# Patient Record
Sex: FEMALE | Race: WHITE | NOT HISPANIC OR LATINO | Employment: FULL TIME | URBAN - NONMETROPOLITAN AREA
[De-identification: names, ages, dates, MRNs, and addresses within clinical notes are randomized per-mention and may not be internally consistent; named-entity substitution may affect disease eponyms.]

---

## 2020-12-14 ENCOUNTER — OFFICE VISIT (OUTPATIENT)
Dept: UROLOGY | Facility: CLINIC | Age: 60
End: 2020-12-14

## 2020-12-14 VITALS — BODY MASS INDEX: 31.68 KG/M2 | HEIGHT: 61 IN | WEIGHT: 167.8 LBS | TEMPERATURE: 98.1 F

## 2020-12-14 DIAGNOSIS — N95.2 ATROPHIC VAGINITIS: ICD-10-CM

## 2020-12-14 DIAGNOSIS — R35.0 FREQUENCY OF MICTURITION: Primary | ICD-10-CM

## 2020-12-14 DIAGNOSIS — N39.0 RECURRENT UTI: ICD-10-CM

## 2020-12-14 DIAGNOSIS — R31.29 OTHER MICROSCOPIC HEMATURIA: ICD-10-CM

## 2020-12-14 DIAGNOSIS — R33.9 INCOMPLETE EMPTYING OF BLADDER: ICD-10-CM

## 2020-12-14 LAB
BILIRUB BLD-MCNC: NEGATIVE MG/DL
CLARITY, POC: CLEAR
COLOR UR: YELLOW
GLUCOSE UR STRIP-MCNC: NEGATIVE MG/DL
KETONES UR QL: NEGATIVE
LEUKOCYTE EST, POC: NEGATIVE
NITRITE UR-MCNC: NEGATIVE MG/ML
PH UR: 7 [PH] (ref 5–8)
PROT UR STRIP-MCNC: NEGATIVE MG/DL
RBC # UR STRIP: ABNORMAL /UL
SP GR UR: 1.02 (ref 1–1.03)
UROBILINOGEN UR QL: NORMAL

## 2020-12-14 PROCEDURE — 81003 URINALYSIS AUTO W/O SCOPE: CPT | Performed by: NURSE PRACTITIONER

## 2020-12-14 PROCEDURE — 99204 OFFICE O/P NEW MOD 45 MIN: CPT | Performed by: NURSE PRACTITIONER

## 2020-12-14 PROCEDURE — 87086 URINE CULTURE/COLONY COUNT: CPT | Performed by: NURSE PRACTITIONER

## 2020-12-14 PROCEDURE — 51798 US URINE CAPACITY MEASURE: CPT | Performed by: NURSE PRACTITIONER

## 2020-12-14 RX ORDER — NITROFURANTOIN 25; 75 MG/1; MG/1
100 CAPSULE ORAL
COMMUNITY
Start: 2020-12-09 | End: 2020-12-17

## 2020-12-14 RX ORDER — ZOLPIDEM TARTRATE 10 MG/1
10 TABLET ORAL
COMMUNITY
Start: 2020-12-08

## 2020-12-14 NOTE — PROGRESS NOTES
Chief Complaint:          Chief Complaint   Patient presents with   • Urinary Frequency     New Patient    • Urinary Tract Infection       HPI:   60 y.o. female.very pleasant patient Presents to clinic today.    She has been referred by her primary care Dr. Blaine Romero DO,  with concerns about recurrent UTI'S. She reports the numerous use of antibiotics in the last few months for UTI. However she is unsure about any documented positive urine culture.     She reports urine frequency, urgency, and dysuria.  She has pelvic pain, pressure and bladder pain/discomfort. She reports abdominal pain, back pain and left CVA tenderness. She denies  gross hematuria. No  Fevers/chills, no N/V/D. She denies any issues with severe constipation, denies stress urinary incontinence.    Her Urine dipstick today is completely negative for any infection.  She has trace microscopic hematuria.  Her PVR is 10 cc.  She reports seeing OBGYN earlier this year in January for her pelvic/bladder discomfort and had a negative Pap smear.  She has been  placed on Premarin cream for Atrophic vaginitis.    She is a G2, P2 has had a partial hysterectomy, breast reduction.  She does not smoke, denies any family history of breast/uterine/bladder cancer.      She is a , relatively healthy with no significant medical problems besides insomnia, anxiety, osteoarthritis.    Past Medical History:      History reviewed. No pertinent past medical history.      The following portions of the patient's history were reviewed and updated as appropriate: allergies, current medications, past family history, past medical history, past social history, past surgical history and problem list.    Current Meds:     Current Outpatient Medications   Medication Sig Dispense Refill   • nitrofurantoin, macrocrystal-monohydrate, (MACROBID) 100 MG capsule 100 mg.     • zolpidem (AMBIEN) 10 MG tablet Take 10 mg by mouth every night at bedtime.       No  current facility-administered medications for this visit.         Allergies:      Allergies   Allergen Reactions   • Bactrim [Sulfamethoxazole-Trimethoprim] Swelling     Itching,throat felt tight         Past Surgical History:     History reviewed. No pertinent surgical history.      Social History:     Social History     Socioeconomic History   • Marital status: Unknown     Spouse name: Not on file   • Number of children: Not on file   • Years of education: Not on file   • Highest education level: Not on file       Family History:     History reviewed. No pertinent family history.    Review of Systems:     Review of Systems   Constitutional: Positive for fatigue. Negative for activity change, chills and fever.   HENT: Positive for sinus pressure. Negative for congestion.    Eyes: Negative for blurred vision, pain and redness.   Respiratory: Negative for chest tightness and shortness of breath.    Cardiovascular: Negative for chest pain.   Gastrointestinal: Positive for abdominal pain. Negative for constipation, diarrhea, nausea and vomiting.   Endocrine: Negative for heat intolerance.   Genitourinary: Positive for dyspareunia, dysuria, flank pain, frequency, hematuria, pelvic pain, pelvic pressure and urgency. Negative for difficulty urinating, genital sores, urinary incontinence and vaginal discharge.   Musculoskeletal: Positive for back pain.   Skin: Negative for rash.   Neurological: Negative for dizziness, headache and confusion.   Hematological: Does not bruise/bleed easily.   Psychiatric/Behavioral: Positive for sleep disturbance and stress. Negative for behavioral problems and decreased concentration. The patient is nervous/anxious.         Physical Exam:     Physical Exam  Constitutional:       General: She is not in acute distress.     Appearance: She is well-developed.   HENT:      Head: Normocephalic and atraumatic.   Eyes:      Pupils: Pupils are equal, round, and reactive to light.   Neck:       Musculoskeletal: Normal range of motion.      Thyroid: No thyromegaly.      Trachea: No tracheal deviation.   Cardiovascular:      Rate and Rhythm: Normal rate and regular rhythm.      Heart sounds: No murmur.   Pulmonary:      Effort: Pulmonary effort is normal. No respiratory distress.      Breath sounds: Normal breath sounds. No stridor. No wheezing.   Abdominal:      General: Bowel sounds are normal.      Palpations: Abdomen is soft.      Tenderness: There is abdominal tenderness.   Genitourinary:     Labia:         Right: No tenderness.         Left: No tenderness.       Vagina: Normal. No vaginal discharge.      Comments: Soft nontender abdomen with no organomegaly, rigidity, guarding or tenderness.  Normal vaginal orifice.  She has  no leakage with Valsalva.  No significant perineal body abnormalities and a normal external anus.     Musculoskeletal: Normal range of motion.         General: Tenderness present. No deformity.   Skin:     General: Skin is warm and dry.      Capillary Refill: Capillary refill takes less than 2 seconds.      Coloration: Skin is not pale.      Findings: No erythema or rash.   Neurological:      Mental Status: She is alert and oriented to person, place, and time.      Cranial Nerves: No cranial nerve deficit.      Sensory: No sensory deficit.      Coordination: Coordination normal.   Psychiatric:         Behavior: Behavior normal.         Thought Content: Thought content normal.         Judgment: Judgment normal.         Procedure:       Assessment/Plan:        ASSESSMENT  Recurrent urinary tract infections/Atrophic Vaginitis : Very pleasant and energetic 60 year old female evaluated in clinic today for  recurrent UTI's, abdominal/pelvic pain, vaginal dryness.  Upon evaluation today, she reports episodes of frequency and urgency,  back/abdominal pain that have remained very bothersome to her. She has urinary symptoms of dysuria,  burning on urination. She has not had any gross  hematuria. She denies N/V/D.  Currently completed antibiotics for UTI, urine dipstick today is negative for any infection.  PVR is 10 cc.     We discussed the types of organisms that are found in the urinary tract indicating that the vast majority are results of the patient's own gastrointestinal familia.  We discussed the risk factors for recurrent infections being intercourse in younger patients and atrophic changes in older patients.      We discussed the symptoms that are found including pain, pressure, burning, frequency, urgency suprapubic pain. We discussed how many of the antibiotics that are utilized can actually exacerbate these infections by creating resistant organisms and there is only a very few antibiotics(Macrobid) that are concentrated in the urine and do not affect the rectal reservoir nor cause recurrent yeast vaginitis.       We discussed the risk factors for recurrent infections being intercourse in younger patients and atrophic changes in older patients.  We discussed the symptoms that are found including pain, pressure, burning, frequency, urgency suprapubic pain and painful intercourse. I discussed upper tract symptoms including fevers, chills, and indicated the workup would be much more aggressive if the patient were to present with recurrent infections in the face of upper tract symptomatology such as fever.        PLAN  We will send her urine for culture today, call her with results and definitive plan of care.    We discussed starting antibiotic prophylaxis with Macrobid 100 mg daily.  She should increase her p.o. fluid intake to at least 1 to 2 L daily and avoid bladder irritants such as caffeine products, spicy foods, and citrusy foods.     I recommend concomitant probiotics with treatment with antibiotics to protect the rectal reservoir including over-the-counter yogurt preparations to rachel oral pills containing the appropriate probiotics. Patient reports the diligent use of  Probiotics.    She is to also continue her Premarin cream for atrophic vaginitis as directed.    Discuss getting CT Abd/Pelvis to rule out any Parenchymal disorder -upper tract investigation.    We discussed lower tract investigation, patient has been scheduled for cystoscopy on 01/26/2021    Will See patient in TWO weeks  and review her Labs/CT scan results    Patient Agreeable with plan of care.    Patient reports that she is not currently experiencing any symptoms of urinary incontinence.    Patient's Body mass index is 31.71 kg/m². BMI is above normal parameters. Recommendations include: educational material, exercise counseling and nutrition counseling.    Smoking Cessation Counseling:  Never a smoker.  Patient does not currently use any tobacco products.     Counseling was given to patient for the following topics diagnostic results including: Acute cystitis with hematuria, atrophic vaginitis., instructions for management as follows: Increase p.o. fluid intake, antibiotic suppressive therapy with Macrobid, probiotics, Pyridium as needed, Phenergan for nausea, perineal hygiene., risk factor reductions including: Bladder irritants such as caffeine products, coffee, tea, soda drinks, citrusy/spicy foods. and patient and family education including: Acute cystitis, recurrent UTIs, cystoscopy, CT scan abdomen/pelvis.. The interim medical history and current results were reviewed.  A treatment plan with follow-up was made for Frequency of micturition [R35.0].           This document has been electronically signed by Griselda Cheng-Akwa, APRN December 14, 2020 13:51 EST

## 2020-12-15 LAB — BACTERIA SPEC AEROBE CULT: NO GROWTH

## 2020-12-17 ENCOUNTER — TELEPHONE (OUTPATIENT)
Dept: UROLOGY | Facility: CLINIC | Age: 60
End: 2020-12-17

## 2020-12-17 RX ORDER — NITROFURANTOIN 25; 75 MG/1; MG/1
100 CAPSULE ORAL DAILY
Qty: 56 CAPSULE | Refills: 3 | Status: SHIPPED | OUTPATIENT
Start: 2020-12-17 | End: 2021-04-06 | Stop reason: SDUPTHER

## 2020-12-17 RX ORDER — PHENAZOPYRIDINE HYDROCHLORIDE 200 MG/1
200 TABLET, FILM COATED ORAL 3 TIMES DAILY PRN
Qty: 20 TABLET | Refills: 0 | Status: SHIPPED | OUTPATIENT
Start: 2020-12-17 | End: 2021-04-06 | Stop reason: SDUPTHER

## 2020-12-17 RX ORDER — PROMETHAZINE HYDROCHLORIDE 25 MG/1
25 TABLET ORAL EVERY 8 HOURS PRN
Qty: 20 TABLET | Refills: 0 | Status: SHIPPED | OUTPATIENT
Start: 2020-12-17 | End: 2021-01-26 | Stop reason: SDUPTHER

## 2020-12-17 NOTE — TELEPHONE ENCOUNTER
Called patient to  Check on how she was doing post clinic visit, and also to discuss urine culture results came back negative for any growth. Spoke with her .    Urine Culture No growth        Continue abx suppressive therapy as discussed, probiotics and get CT. May call with questions.

## 2020-12-18 ENCOUNTER — TELEPHONE (OUTPATIENT)
Dept: UROLOGY | Facility: CLINIC | Age: 60
End: 2020-12-18

## 2020-12-18 NOTE — PATIENT INSTRUCTIONS
Atrophic Vaginitis  Atrophic vaginitis is a condition in which the tissues that line the vagina become dry and thin. This condition occurs in women who have stopped having their period. It is caused by a drop in a female hormone (estrogen). This hormone helps:  · To keep the vagina moist.  · To make a clear fluid. This clear fluid helps:  ? To make the vagina ready for sex.  ? To protect the vagina from infection.  If the lining of the vagina is dry and thin, it may cause irritation, burning, or itchiness. It may also:  · Make sex painful.  · Make an exam of your vagina painful.  · Cause bleeding.  · Make you lose interest in sex.  · Cause a burning feeling when you pee (urinate).  · Cause a brown or yellow fluid to come from your vagina.  Some women do not have symptoms.  Follow these instructions at home:  Medicines  · Take over-the-counter and prescription medicines only as told by your doctor.  · Do not use herbs or other medicines unless your doctor says it is okay.  · Use medicines for for dryness. These include:  ? Oils to make the vagina soft.  ? Creams.  ? Moisturizers.  General instructions  · Do not douche.  · Do not use products that can make your vagina dry. These include:  ? Scented sprays.  ? Scented tampons.  ? Scented soaps.  · Sex can help increase blood flow and soften the tissue in the vagina. If it hurts to have sex:  ? Tell your partner.  ? Use products to make sex more comfortable. Use these only as told by your doctor.  Contact a doctor if you:  · Have discharge from the vagina that is different than usual.  · Have a bad smell coming from your vagina.  · Have new symptoms.  · Do not get better.  · Get worse.  Summary  · Atrophic vaginitis is a condition in which the lining of the vagina becomes dry and thin.  · This condition affects women who have stopped having their periods.  · Treatment may include using products that help make the vagina soft.  · Call a doctor if do not get better with  treatment.  This information is not intended to replace advice given to you by your health care provider. Make sure you discuss any questions you have with your health care provider.  Document Revised: 12/31/2018 Document Reviewed: 12/31/2018  [employer] Patient Education © 2020 [email or web address]  Dysuria  Dysuria is pain or discomfort while urinating. The pain or discomfort may be felt in the part of your body that drains urine from the bladder (urethra) or in the surrounding tissue of the genitals. The pain may also be felt in the groin area, lower abdomen, or lower back. You may have to urinate frequently or have the sudden feeling that you have to urinate (urgency). Dysuria can affect both men and women, but it is more common in women.  Dysuria can be caused by many different things, including:  · Urinary tract infection.  · Kidney stones or bladder stones.  · Certain sexually transmitted infections (STIs), such as chlamydia.  · Dehydration.  · Inflammation of the tissues of the vagina.  · Use of certain medicines.  · Use of certain soaps or scented products that cause irritation.  Follow these instructions at home:  General instructions  · Watch your condition for any changes.  · Urinate often. Avoid holding urine for long periods of time.  · After a bowel movement or urination, women should cleanse from front to back, using each tissue only once.  · Urinate after sexual intercourse.  · Keep all follow-up visits as told by your health care provider. This is important.  · If you had any tests done to find the cause of dysuria, it is up to you to get your test results. Ask your health care provider, or the department that is doing the test, when your results will be ready.  Eating and drinking    · Drink enough fluid to keep your urine pale yellow.  · Avoid caffeine, tea, and alcohol. They can irritate the bladder and make dysuria worse. In men, alcohol may irritate the prostate.  Medicines  · Take over-the-counter and  prescription medicines only as told by your health care provider.  · If you were prescribed an antibiotic medicine, take it as told by your health care provider. Do not stop taking the antibiotic even if you start to feel better.  Contact a health care provider if:  · You have a fever.  · You develop pain in your back or sides.  · You have nausea or vomiting.  · You have blood in your urine.  · You are not urinating as often as you usually do.  Get help right away if:  · Your pain is severe and not relieved with medicines.  · You cannot eat or drink without vomiting.  · You are confused.  · You have a rapid heartbeat while at rest.  · You have shaking or chills.  · You feel extremely weak.  Summary  · Dysuria is pain or discomfort while urinating. Many different conditions can lead to dysuria.  · If you have dysuria, you may have to urinate frequently or have the sudden feeling that you have to urinate (urgency).  · Watch your condition for any changes. Keep all follow-up visits as told by your health care provider.  · Make sure that you urinate often and drink enough fluid to keep your urine pale yellow.  This information is not intended to replace advice given to you by your health care provider. Make sure you discuss any questions you have with your health care provider.  Document Revised: 11/30/2018 Document Reviewed: 10/04/2018  [employer] Patient Education © 2020 [employer]  Urinary Tract Infection, Adult  A urinary tract infection (UTI) is an infection of any part of the urinary tract. The urinary tract includes:  · The kidneys.  · The ureters.  · The bladder.  · The urethra.  These organs make, store, and get rid of pee (urine) in the body.  What are the causes?  This is caused by germs (bacteria) in your genital area. These germs grow and cause swelling (inflammation) of your urinary tract.  What increases the risk?  You are more likely to develop this condition if:  · You have a small, thin tube  (catheter) to drain pee.  · You cannot control when you pee or poop (incontinence).  · You are female, and:  ? You use these methods to prevent pregnancy:  § A medicine that kills sperm (spermicide).  § A device that blocks sperm (diaphragm).  ? You have low levels of a female hormone (estrogen).  ? You are pregnant.  · You have genes that add to your risk.  · You are sexually active.  · You take antibiotic medicines.  · You have trouble peeing because of:  ? A prostate that is bigger than normal, if you are male.  ? A blockage in the part of your body that drains pee from the bladder (urethra).  ? A kidney stone.  ? A nerve condition that affects your bladder (neurogenic bladder).  ? Not getting enough to drink.  ? Not peeing often enough.  · You have other conditions, such as:  ? Diabetes.  ? A weak disease-fighting system (immune system).  ? Sickle cell disease.  ? Gout.  ? Injury of the spine.  What are the signs or symptoms?  Symptoms of this condition include:  · Needing to pee right away (urgently).  · Peeing often.  · Peeing small amounts often.  · Pain or burning when peeing.  · Blood in the pee.  · Pee that smells bad or not like normal.  · Trouble peeing.  · Pee that is cloudy.  · Fluid coming from the vagina, if you are female.  · Pain in the belly or lower back.  Other symptoms include:  · Throwing up (vomiting).  · No urge to eat.  · Feeling mixed up (confused).  · Being tired and grouchy (irritable).  · A fever.  · Watery poop (diarrhea).  How is this treated?  This condition may be treated with:  · Antibiotic medicine.  · Other medicines.  · Drinking enough water.  Follow these instructions at home:    Medicines  · Take over-the-counter and prescription medicines only as told by your doctor.  · If you were prescribed an antibiotic medicine, take it as told by your doctor. Do not stop taking it even if you start to feel better.  General instructions  · Make sure you:  ? Pee until your bladder is  empty.  ? Do not hold pee for a long time.  ? Empty your bladder after sex.  ? Wipe from front to back after pooping if you are a female. Use each tissue one time when you wipe.  · Drink enough fluid to keep your pee pale yellow.  · Keep all follow-up visits as told by your doctor. This is important.  Contact a doctor if:  · You do not get better after 1-2 days.  · Your symptoms go away and then come back.  Get help right away if:  · You have very bad back pain.  · You have very bad pain in your lower belly.  · You have a fever.  · You are sick to your stomach (nauseous).  · You are throwing up.  Summary  · A urinary tract infection (UTI) is an infection of any part of the urinary tract.  · This condition is caused by germs in your genital area.  · There are many risk factors for a UTI. These include having a small, thin tube to drain pee and not being able to control when you pee or poop.  · Treatment includes antibiotic medicines for germs.  · Drink enough fluid to keep your pee pale yellow.  This information is not intended to replace advice given to you by your health care provider. Make sure you discuss any questions you have with your health care provider.  Document Revised: 12/05/2019 Document Reviewed: 06/27/2019  [employer] Patient Education © 2020 [employer]

## 2020-12-30 ENCOUNTER — OFFICE VISIT (OUTPATIENT)
Dept: UROLOGY | Facility: CLINIC | Age: 60
End: 2020-12-30

## 2020-12-30 VITALS — BODY MASS INDEX: 31.53 KG/M2 | TEMPERATURE: 96.8 F | WEIGHT: 167 LBS | HEIGHT: 61 IN

## 2020-12-30 DIAGNOSIS — R35.0 FREQUENCY OF URINATION: Primary | ICD-10-CM

## 2020-12-30 LAB
BILIRUB BLD-MCNC: NEGATIVE MG/DL
CLARITY, POC: CLEAR
COLOR UR: YELLOW
GLUCOSE UR STRIP-MCNC: NEGATIVE MG/DL
KETONES UR QL: NEGATIVE
LEUKOCYTE EST, POC: NEGATIVE
NITRITE UR-MCNC: NEGATIVE MG/ML
PH UR: 5.5 [PH] (ref 5–8)
PROT UR STRIP-MCNC: NEGATIVE MG/DL
RBC # UR STRIP: ABNORMAL /UL
SP GR UR: 1.01 (ref 1–1.03)
UROBILINOGEN UR QL: NORMAL

## 2020-12-30 PROCEDURE — 99214 OFFICE O/P EST MOD 30 MIN: CPT | Performed by: NURSE PRACTITIONER

## 2020-12-30 PROCEDURE — 81003 URINALYSIS AUTO W/O SCOPE: CPT | Performed by: NURSE PRACTITIONER

## 2020-12-30 NOTE — PROGRESS NOTES
Chief Complaint:          Chief Complaint   Patient presents with   • Urinary Frequency     follow up Recurrent UTIs/Atrophic Vaginitis       HPI:   60 y.o. female.  Evaluated in clinic 2 weeks ago for recurrent UTIs and bothersome urinary symptoms returns for follow-up today.  The plan was to review her CT abdomen and pelvis today, pending cystoscopy,  nevertheless patient reports she has been unable to get her CT done prior to this appointment.  Patient has CT scheduled for January 13, 2021.    She however reports some improvement in her urinary symptoms since starting antibiotic prophylaxis with Macrobid daily, Pyridium, and Myrbetriq as ordered.  Significant is her Premarin cream for atrophic vaginitis.  She is pleased with her slow but remarkable outcome so far.    Urine dipstick today is completely negative for any infection, she has 1+ microscopic hematuria.  Her last urine culture was negative for any growth. She reports seeing OBGYN earlier this year in January for her pelvic/bladder discomfort and had a negative Pap smear.  .     She is a G2, P2 has had a partial hysterectomy, breast reduction.  She does not smoke, denies any family history of breast/uterine/bladder cancer.       She is a , relatively healthy with no significant medical problems besides insomnia, anxiety, osteoarthritis.      Past Medical History:        Past Medical History:   Diagnosis Date   • Arthritis    • Urinary tract infection      The following portions of the patient's history were reviewed and updated as appropriate: allergies, current medications, past family history, past medical history, past social history, past surgical history and problem list.    Current Meds:     Current Outpatient Medications   Medication Sig Dispense Refill   • conjugated estrogens (PREMARIN) 0.625 MG/GM vaginal cream DO NOT USE SYRINGE. APPLY PEA SIZE TO INDEX FINGER, APPLY TO VAGINA TWICE WEEKLY. 30 g 2   • nitrofurantoin,  macrocrystal-monohydrate, (Macrobid) 100 MG capsule Take 1 capsule by mouth Daily. 56 capsule 3   • phenazopyridine (Pyridium) 200 MG tablet Take 1 tablet by mouth 3 (Three) Times a Day As Needed for Bladder Spasms. 20 tablet 0   • promethazine (PHENERGAN) 25 MG tablet Take 1 tablet by mouth Every 8 (Eight) Hours As Needed for Nausea or Vomiting. 20 tablet 0   • zolpidem (AMBIEN) 10 MG tablet Take 10 mg by mouth every night at bedtime.       No current facility-administered medications for this visit.         Allergies:      Allergies   Allergen Reactions   • Bactrim [Sulfamethoxazole-Trimethoprim] Swelling     Itching,throat felt tight         Past Surgical History:     Past Surgical History:   Procedure Laterality Date   • HYSTERECTOMY     • LAPAROSCOPIC CHOLECYSTECTOMY     • NOSE SURGERY     • REDUCTION MAMMAPLASTY           Social History:     Social History     Socioeconomic History   • Marital status: Unknown     Spouse name: Not on file   • Number of children: Not on file   • Years of education: Not on file   • Highest education level: Not on file   Tobacco Use   • Smoking status: Never Smoker   • Smokeless tobacco: Never Used   Substance and Sexual Activity   • Alcohol use: Yes   • Drug use: Never   • Sexual activity: Defer       Family History:     Family History   Problem Relation Age of Onset   • Heart disease Father    • Hypertension Father    • Heart disease Mother        Review of Systems:     Review of Systems   Constitutional: Positive for activity change. Negative for chills, fatigue and fever.   HENT: Negative for congestion.    Eyes: Negative for blurred vision.   Gastrointestinal: Negative for abdominal pain, nausea and vomiting.   Genitourinary: Positive for dyspareunia, frequency, hematuria, pelvic pain and pelvic pressure. Negative for difficulty urinating, dysuria, flank pain, genital sores, urgency, urinary incontinence and vaginal discharge.   Musculoskeletal: Negative for back pain.      Neurological: Negative for dizziness, headache and confusion.   Psychiatric/Behavioral: Positive for sleep disturbance and stress. Negative for behavioral problems and decreased concentration.        Physical Exam:     Physical Exam  Constitutional:       General: She is not in acute distress.     Appearance: She is well-developed.   HENT:      Head: Normocephalic and atraumatic.   Eyes:      Pupils: Pupils are equal, round, and reactive to light.   Neck:      Musculoskeletal: Normal range of motion.      Thyroid: No thyromegaly.      Trachea: No tracheal deviation.   Cardiovascular:      Rate and Rhythm: Normal rate and regular rhythm.      Heart sounds: No murmur.   Pulmonary:      Effort: Pulmonary effort is normal. No respiratory distress.      Breath sounds: Normal breath sounds. No stridor. No wheezing.   Abdominal:      General: Bowel sounds are normal.      Palpations: Abdomen is soft.      Tenderness: There is abdominal tenderness.   Genitourinary:     Labia:         Right: No tenderness.         Left: No tenderness.       Vagina: Normal. No vaginal discharge.   Musculoskeletal: Normal range of motion.         General: Tenderness present. No deformity.   Skin:     General: Skin is warm and dry.      Capillary Refill: Capillary refill takes less than 2 seconds.      Coloration: Skin is not pale.      Findings: No erythema or rash.   Neurological:      Mental Status: She is alert and oriented to person, place, and time.      Cranial Nerves: No cranial nerve deficit.      Sensory: No sensory deficit.      Coordination: Coordination normal.   Psychiatric:         Behavior: Behavior normal.         Thought Content: Thought content normal.         Judgment: Judgment normal.         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  Reports significant improvement in her symptoms since starting on medications.  Urine dipstick today is completely negative for any infection, she has 1+ microscopic hematuria.      We discussed the types of organisms that are found in the urinary tract indicating that the vast majority are results of the patient's own gastrointestinal familia.  We discussed the risk factors for recurrent infections being intercourse in younger patients and atrophic changes in older patients.       We discussed the symptoms that are found including pain, pressure, burning, frequency, urgency suprapubic pain. We discussed how many of the antibiotics that are utilized can actually exacerbate these infections by creating resistant organisms and there is only a very few antibiotics(Macrobid) that are concentrated in the urine and do not affect the rectal reservoir nor cause recurrent yeast vaginitis.        We discussed the risk factors for recurrent infections being intercourse in younger patients and atrophic changes in older patients.  We discussed the symptoms that are found including pain, pressure, burning, frequency, urgency suprapubic pain and painful intercourse. I discussed upper tract symptoms including fevers, chills, and indicated the workup would be much more aggressive if the patient were to present with recurrent infections in the face of upper tract symptomatology such as fever.     Patient reports vaginal pain and significant dyspareunia.  Again we discussed post menopausal Vaginal atrophy (atrophic vaginitis) with patient as the  thinning, drying and inflammation of the vaginal walls that may occur when womens body has less estrogen. Discussed the facts that Vaginal atrophy occurs most often after menopause and may also lead to distressing urinary symptoms such as the burning and pain she is experiencing.                                                      PLAN     We discussed she continue her Antibiotic Prophylaxis with Macrobid 100 mg daily.  She should increase her p.o. fluid intake to at least 1 to 2 L daily and avoid bladder irritants such as caffeine products, spicy foods, and citrusy foods.      I recommend concomitant probiotics with treatment with antibiotics to protect the rectal reservoir including over-the-counter yogurt preparations to rachel oral pills containing the appropriate probiotics. Patient reports the diligent use of Probiotics.     She is to also continue her Premarin cream for atrophic vaginitis as directed.     Discuss getting CT Abd/Pelvis to rule out any Parenchymal disorder -upper tract investigation.  Patient has been scheduled for 01/13/2020     We discussed lower tract investigation, patient has been scheduled for Cystoscopy on 01/26/2021 with Dr. Bunn     Follow-up with patient after procedure     Patient Agreeable with plan of care.     Patient reports that she is not currently experiencing any symptoms of urinary incontinence.     Patient's Body mass index is 31.55 kg/m². BMI is above normal parameters. Recommendations include: educational material, exercise counseling and nutrition counseling.    Smoking Cessation Counseling:  Never a smoker.  Patient does not currently use any tobacco products.      Counseling was given to patient for the following topics diagnostic results including: Acute cystitis with hematuria, atrophic vaginitis., instructions for management as follows: Increase p.o. fluid intake, antibiotic suppressive therapy with Macrobid, probiotics, Pyridium as needed, Phenergan for nausea, perineal hygiene., risk factor reductions including: Bladder irritants such as caffeine products, coffee, tea, soda drinks, citrusy/spicy foods. and patient and family education including: Acute cystitis, recurrent UTIs, cystoscopy, CT scan abdomen/pelvis.. The interim medical history and current results were reviewed.  A treatment  plan with follow-up was made for: Atrophic vaginitis,  Frequency of urination [R35.0].         This document has been electronically signed by Griselda Cheng-Akwa, APRN December 30, 2020 22:21 EST

## 2021-01-13 ENCOUNTER — HOSPITAL ENCOUNTER (OUTPATIENT)
Dept: CT IMAGING | Facility: HOSPITAL | Age: 61
Discharge: HOME | End: 2021-01-13
Admitting: NURSE PRACTITIONER

## 2021-01-13 DIAGNOSIS — N39.0 RECURRENT UTI: ICD-10-CM

## 2021-01-13 DIAGNOSIS — R31.29 OTHER MICROSCOPIC HEMATURIA: ICD-10-CM

## 2021-01-13 LAB — CREAT BLDA-MCNC: 0.8 MG/DL (ref 0.6–1.3)

## 2021-01-13 PROCEDURE — 25010000002 IOPAMIDOL 61 % SOLUTION: Performed by: NURSE PRACTITIONER

## 2021-01-13 PROCEDURE — 74178 CT ABD&PLV WO CNTR FLWD CNTR: CPT

## 2021-01-13 PROCEDURE — 74178 CT ABD&PLV WO CNTR FLWD CNTR: CPT | Performed by: RADIOLOGY

## 2021-01-13 PROCEDURE — 82565 ASSAY OF CREATININE: CPT

## 2021-01-13 RX ADMIN — IOPAMIDOL 100 ML: 612 INJECTION, SOLUTION INTRAVENOUS at 10:10

## 2021-01-26 ENCOUNTER — PROCEDURE VISIT (OUTPATIENT)
Dept: UROLOGY | Facility: CLINIC | Age: 61
End: 2021-01-26

## 2021-01-26 VITALS — HEIGHT: 61 IN | TEMPERATURE: 98.4 F | BODY MASS INDEX: 31.53 KG/M2 | WEIGHT: 167 LBS

## 2021-01-26 DIAGNOSIS — R11.0 NAUSEA WITHOUT VOMITING: Primary | ICD-10-CM

## 2021-01-26 DIAGNOSIS — N32.81 DETRUSOR INSTABILITY: ICD-10-CM

## 2021-01-26 DIAGNOSIS — N39.0 RECURRENT UTI: ICD-10-CM

## 2021-01-26 DIAGNOSIS — R35.0 FREQUENCY OF MICTURITION: ICD-10-CM

## 2021-01-26 PROCEDURE — 99213 OFFICE O/P EST LOW 20 MIN: CPT | Performed by: UROLOGY

## 2021-01-26 PROCEDURE — 96372 THER/PROPH/DIAG INJ SC/IM: CPT | Performed by: UROLOGY

## 2021-01-26 PROCEDURE — 51725 SIMPLE CYSTOMETROGRAM: CPT | Performed by: UROLOGY

## 2021-01-26 PROCEDURE — 52000 CYSTOURETHROSCOPY: CPT | Performed by: UROLOGY

## 2021-01-26 RX ORDER — GENTAMICIN SULFATE 40 MG/ML
80 INJECTION, SOLUTION INTRAMUSCULAR; INTRAVENOUS ONCE
Status: COMPLETED | OUTPATIENT
Start: 2021-01-26 | End: 2021-01-26

## 2021-01-26 RX ORDER — PROMETHAZINE HYDROCHLORIDE 25 MG/1
25 TABLET ORAL EVERY 8 HOURS PRN
Qty: 20 TABLET | Refills: 0 | Status: SHIPPED | OUTPATIENT
Start: 2021-01-26 | End: 2021-04-06 | Stop reason: SDUPTHER

## 2021-01-26 RX ADMIN — GENTAMICIN SULFATE 80 MG: 40 INJECTION, SOLUTION INTRAMUSCULAR; INTRAVENOUS at 16:08

## 2021-02-02 PROBLEM — N39.0 RECURRENT UTI: Status: ACTIVE | Noted: 2021-02-02

## 2021-02-02 PROBLEM — N32.81 DETRUSOR INSTABILITY: Status: ACTIVE | Noted: 2021-02-02

## 2021-04-06 ENCOUNTER — OFFICE VISIT (OUTPATIENT)
Dept: UROLOGY | Facility: CLINIC | Age: 61
End: 2021-04-06

## 2021-04-06 VITALS — TEMPERATURE: 96.4 F | BODY MASS INDEX: 31.53 KG/M2 | WEIGHT: 167 LBS | HEIGHT: 61 IN

## 2021-04-06 DIAGNOSIS — R35.0 FREQUENCY OF MICTURITION: ICD-10-CM

## 2021-04-06 DIAGNOSIS — R11.0 NAUSEA WITHOUT VOMITING: ICD-10-CM

## 2021-04-06 DIAGNOSIS — N39.0 RECURRENT UTI: ICD-10-CM

## 2021-04-06 DIAGNOSIS — R35.0 FREQUENCY OF URINATION: Primary | ICD-10-CM

## 2021-04-06 DIAGNOSIS — N95.2 ATROPHIC VAGINITIS: ICD-10-CM

## 2021-04-06 LAB
BILIRUB BLD-MCNC: NEGATIVE MG/DL
CLARITY, POC: CLEAR
COLOR UR: YELLOW
GLUCOSE UR STRIP-MCNC: NEGATIVE MG/DL
KETONES UR QL: NEGATIVE
LEUKOCYTE EST, POC: NEGATIVE
NITRITE UR-MCNC: NEGATIVE MG/ML
PH UR: 6.5 [PH] (ref 5–8)
PROT UR STRIP-MCNC: NEGATIVE MG/DL
RBC # UR STRIP: ABNORMAL /UL
SP GR UR: 1.02 (ref 1–1.03)
UROBILINOGEN UR QL: NORMAL

## 2021-04-06 PROCEDURE — 99214 OFFICE O/P EST MOD 30 MIN: CPT | Performed by: NURSE PRACTITIONER

## 2021-04-06 PROCEDURE — 81003 URINALYSIS AUTO W/O SCOPE: CPT | Performed by: NURSE PRACTITIONER

## 2021-04-06 RX ORDER — PHENAZOPYRIDINE HYDROCHLORIDE 200 MG/1
200 TABLET, FILM COATED ORAL 3 TIMES DAILY PRN
Qty: 20 TABLET | Refills: 2 | Status: SHIPPED | OUTPATIENT
Start: 2021-04-06 | End: 2021-10-05 | Stop reason: SDUPTHER

## 2021-04-06 RX ORDER — NITROFURANTOIN 25; 75 MG/1; MG/1
100 CAPSULE ORAL DAILY
Qty: 56 CAPSULE | Refills: 5 | Status: SHIPPED | OUTPATIENT
Start: 2021-04-06 | End: 2021-10-05 | Stop reason: SDUPTHER

## 2021-04-06 RX ORDER — PROMETHAZINE HYDROCHLORIDE 25 MG/1
25 TABLET ORAL EVERY 8 HOURS PRN
Qty: 20 TABLET | Refills: 0 | Status: SHIPPED | OUTPATIENT
Start: 2021-04-06 | End: 2021-10-05 | Stop reason: SDUPTHER

## 2021-04-06 NOTE — PATIENT INSTRUCTIONS
Atrophic Vaginitis  Atrophic vaginitis is a condition in which the tissues that line the vagina become dry and thin. This condition occurs in women who have stopped having their period. It is caused by a drop in a female hormone (estrogen). This hormone helps:  · To keep the vagina moist.  · To make a clear fluid. This clear fluid helps:  ? To make the vagina ready for sex.  ? To protect the vagina from infection.  If the lining of the vagina is dry and thin, it may cause irritation, burning, or itchiness. It may also:  · Make sex painful.  · Make an exam of your vagina painful.  · Cause bleeding.  · Make you lose interest in sex.  · Cause a burning feeling when you pee (urinate).  · Cause a brown or yellow fluid to come from your vagina.  Some women do not have symptoms.  Follow these instructions at home:  Medicines  · Take over-the-counter and prescription medicines only as told by your doctor.  · Do not use herbs or other medicines unless your doctor says it is okay.  · Use medicines for for dryness. These include:  ? Oils to make the vagina soft.  ? Creams.  ? Moisturizers.  General instructions  · Do not douche.  · Do not use products that can make your vagina dry. These include:  ? Scented sprays.  ? Scented tampons.  ? Scented soaps.  · Sex can help increase blood flow and soften the tissue in the vagina. If it hurts to have sex:  ? Tell your partner.  ? Use products to make sex more comfortable. Use these only as told by your doctor.  Contact a doctor if you:  · Have discharge from the vagina that is different than usual.  · Have a bad smell coming from your vagina.  · Have new symptoms.  · Do not get better.  · Get worse.  Summary  · Atrophic vaginitis is a condition in which the lining of the vagina becomes dry and thin.  · This condition affects women who have stopped having their periods.  · Treatment may include using products that help make the vagina soft.  · Call a doctor if do not get better with  treatment.  This information is not intended to replace advice given to you by your health care provider. Make sure you discuss any questions you have with your health care provider.  Document Revised: 12/31/2018 Document Reviewed: 12/31/2018  [employer] Patient Education © 2021 [employer]  Urinary Tract Infection, Adult  A urinary tract infection (UTI) is an infection of any part of the urinary tract. The urinary tract includes:  · The kidneys.  · The ureters.  · The bladder.  · The urethra.  These organs make, store, and get rid of pee (urine) in the body.  What are the causes?  This is caused by germs (bacteria) in your genital area. These germs grow and cause swelling (inflammation) of your urinary tract.  What increases the risk?  You are more likely to develop this condition if:  · You have a small, thin tube (catheter) to drain pee.  · You cannot control when you pee or poop (incontinence).  · You are female, and:  ? You use these methods to prevent pregnancy:  § A medicine that kills sperm (spermicide).  § A device that blocks sperm (diaphragm).  ? You have low levels of a female hormone (estrogen).  ? You are pregnant.  · You have genes that add to your risk.  · You are sexually active.  · You take antibiotic medicines.  · You have trouble peeing because of:  ? A prostate that is bigger than normal, if you are male.  ? A blockage in the part of your body that drains pee from the bladder (urethra).  ? A kidney stone.  ? A nerve condition that affects your bladder (neurogenic bladder).  ? Not getting enough to drink.  ? Not peeing often enough.  · You have other conditions, such as:  ? Diabetes.  ? A weak disease-fighting system (immune system).  ? Sickle cell disease.  ? Gout.  ? Injury of the spine.  What are the signs or symptoms?  Symptoms of this condition include:  · Needing to pee right away (urgently).  · Peeing often.  · Peeing small amounts often.  · Pain or burning when peeing.  · Blood in the  pee.  · Pee that smells bad or not like normal.  · Trouble peeing.  · Pee that is cloudy.  · Fluid coming from the vagina, if you are female.  · Pain in the belly or lower back.  Other symptoms include:  · Throwing up (vomiting).  · No urge to eat.  · Feeling mixed up (confused).  · Being tired and grouchy (irritable).  · A fever.  · Watery poop (diarrhea).  How is this treated?  This condition may be treated with:  · Antibiotic medicine.  · Other medicines.  · Drinking enough water.  Follow these instructions at home:    Medicines  · Take over-the-counter and prescription medicines only as told by your doctor.  · If you were prescribed an antibiotic medicine, take it as told by your doctor. Do not stop taking it even if you start to feel better.  General instructions  · Make sure you:  ? Pee until your bladder is empty.  ? Do not hold pee for a long time.  ? Empty your bladder after sex.  ? Wipe from front to back after pooping if you are a female. Use each tissue one time when you wipe.  · Drink enough fluid to keep your pee pale yellow.  · Keep all follow-up visits as told by your doctor. This is important.  Contact a doctor if:  · You do not get better after 1-2 days.  · Your symptoms go away and then come back.  Get help right away if:  · You have very bad back pain.  · You have very bad pain in your lower belly.  · You have a fever.  · You are sick to your stomach (nauseous).  · You are throwing up.  Summary  · A urinary tract infection (UTI) is an infection of any part of the urinary tract.  · This condition is caused by germs in your genital area.  · There are many risk factors for a UTI. These include having a small, thin tube to drain pee and not being able to control when you pee or poop.  · Treatment includes antibiotic medicines for germs.  · Drink enough fluid to keep your pee pale yellow.  This information is not intended to replace advice given to you by your health care provider. Make sure you  discuss any questions you have with your health care provider.  Document Revised: 12/05/2019 Document Reviewed: 06/27/2019  [employer] Patient Education © 2021 [employer]  Overactive Bladder, Adult    Overactive bladder refers to a condition in which a person has a sudden need to pass urine. The person may leak urine if he or she cannot get to the bathroom fast enough (urinary incontinence). A person with this condition may also wake up several times in the night to go to the bathroom.  Overactive bladder is associated with poor nerve signals between your bladder and your brain. Your bladder may get the signal to empty before it is full. You may also have very sensitive muscles that make your bladder squeeze too soon. These symptoms might interfere with daily work or social activities.  What are the causes?  This condition may be associated with or caused by:  · Urinary tract infection.  · Infection of nearby tissues, such as the prostate.  · Prostate enlargement.  · Surgery on the uterus or urethra.  · Bladder stones, inflammation, or tumors.  · Drinking too much caffeine or alcohol.  · Certain medicines, especially medicines that get rid of extra fluid in the body (diuretics).  · Muscle or nerve weakness, especially from:  ? A spinal cord injury.  ? Stroke.  ? Multiple sclerosis.  ? Parkinson's disease.  · Diabetes.  · Constipation.  What increases the risk?  You may be at greater risk for overactive bladder if you:  · Are an older adult.  · Smoke.  · Are going through menopause.  · Have prostate problems.  · Have a neurological disease, such as stroke, dementia, Parkinson's disease, or multiple sclerosis (MS).  · Eat or drink things that irritate the bladder. These include alcohol, spicy food, and caffeine.  · Are overweight or obese.  What are the signs or symptoms?  Symptoms of this condition include:  · Sudden, strong urge to urinate.  · Leaking urine.  · Urinating 8 or more times a day.  · Waking up to  urinate 2 or more times a night.  How is this diagnosed?  Your health care provider may suspect overactive bladder based on your symptoms. He or she will diagnose this condition by:  · A physical exam and medical history.  · Blood or urine tests. You might need bladder or urine tests to help determine what is causing your overactive bladder.  You might also need to see a health care provider who specializes in urinary tract problems (urologist).  How is this treated?  Treatment for overactive bladder depends on the cause of your condition and whether it is mild or severe. You can also make lifestyle changes at home. Options include:  · Bladder training. This may include:  ? Learning to control the urge to urinate by following a schedule that directs you to urinate at regular intervals (timed voiding).  ? Doing Kegel exercises to strengthen your pelvic floor muscles, which support your bladder. Toning these muscles can help you control urination, even if your bladder muscles are overactive.  · Special devices. This may include:  ? Biofeedback, which uses sensors to help you become aware of your body's signals.  ? Electrical stimulation, which uses electrodes placed inside the body (implanted) or outside the body. These electrodes send gentle pulses of electricity to strengthen the nerves or muscles that control the bladder.  ? Women may use a plastic device that fits into the vagina and supports the bladder (pessary).  · Medicines.  ? Antibiotics to treat bladder infection.  ? Antispasmodics to stop the bladder from releasing urine at the wrong time.  ? Tricyclic antidepressants to relax bladder muscles.  ? Injections of botulinum toxin type A directly into the bladder tissue to relax bladder muscles.  · Lifestyle changes. This may include:  ? Weight loss. Talk to your health care provider about weight loss methods that would work best for you.  ? Diet changes. This may include reducing how much alcohol and caffeine  you consume, or drinking fluids at different times of the day.  ? Not smoking. Do not use any products that contain nicotine or tobacco, such as cigarettes and e-cigarettes. If you need help quitting, ask your health care provider.  · Surgery.  ? A device may be implanted to help manage the nerve signals that control urination.  ? An electrode may be implanted to stimulate electrical signals in the bladder.  ? A procedure may be done to change the shape of the bladder. This is done only in very severe cases.  Follow these instructions at home:  Lifestyle  · Make any diet or lifestyle changes that are recommended by your health care provider. These may include:  ? Drinking less fluid or drinking fluids at different times of the day.  ? Cutting down on caffeine or alcohol.  ? Doing Kegel exercises.  ? Losing weight if needed.  ? Eating a healthy and balanced diet to prevent constipation. This may include:  § Eating foods that are high in fiber, such as fresh fruits and vegetables, whole grains, and beans.  § Limiting foods that are high in fat and processed sugars, such as fried and sweet foods.  General instructions  · Take over-the-counter and prescription medicines only as told by your health care provider.  · If you were prescribed an antibiotic medicine, take it as told by your health care provider. Do not stop taking the antibiotic even if you start to feel better.  · Use any implants or pessary as told by your health care provider.  · If needed, wear pads to absorb urine leakage.  · Keep a journal or log to track how much and when you drink and when you feel the need to urinate. This will help your health care provider monitor your condition.  · Keep all follow-up visits as told by your health care provider. This is important.  Contact a health care provider if:  · You have a fever.  · Your symptoms do not get better with treatment.  · Your pain and discomfort get worse.  · You have more frequent urges to  urinate.  Get help right away if:  · You are not able to control your bladder.  Summary  · Overactive bladder refers to a condition in which a person has a sudden need to pass urine.  · Several conditions may lead to an overactive bladder.  · Treatment for overactive bladder depends on the cause and severity of your condition.  · Follow your health care provider's instructions about lifestyle changes, doing Kegel exercises, keeping a journal, and taking medicines.  This information is not intended to replace advice given to you by your health care provider. Make sure you discuss any questions you have with your health care provider.  Document Revised: 04/09/2020 Document Reviewed: 01/03/2019  [employer] Patient Education © 2021 [employer]

## 2021-04-06 NOTE — PROGRESS NOTES
"Chief Complaint  Urinary Frequency/ Recurrent UTIs (follow up Recurrent UTI/Urine Freq/Atrophic Vaginitis)    Subjective     {Problem List  Visit Diagnosis   Encounters  Notes  Medications  Labs  Result Review Imaging  Media :23}     Antoinette Leigh presents to [facility]  History of Present Illness  Ms. Antoinette Leigh is a very pleasant 60-year-old female Patient evaluated in clinic  today.  She is a follow-up for recurrent urinary tract infections and atrophic vaginitis. She reports currently doing better on antibiotic prophylaxis with Macrobid.  She also reports  A significant  improvement in her urinary symptoms.  She denies having any more episodes of frequency, urgency, or nocturia. She denies dysuria, burning on urination, or gross hematuria.  Denies back pain, pelvic pain and pressure, she denies fever or chills, she does not have nausea, vomiting, or diarrhea.  Her Urine dipstick is completely negative for any infection, she has 2+ microscopic hematuria.  She reports she currently finished antibiotic therapy for strep infection.    Overall she reports doing better, she states she is currently taking her antibiotics and probiotics diligently.  She reports  increasing her p.o. fluid intake to at least 2 L daily, and has significantly reduced her intake of soda drinks. She drinks more water and cranberry drinks.  He is also using her Premarin cream diligently and reports significant improvement.    Objective   Vital Signs:   Temp 96.4 °F (35.8 °C)   Ht 154.9 cm (61\")   Wt 75.8 kg (167 lb)   BMI 31.55 kg/m²     Physical Exam  Constitutional:       General: She is not in acute distress.     Appearance: She is well-developed.   HENT:      Head: Normocephalic and atraumatic.   Eyes:      Pupils: Pupils are equal, round, and reactive to light.   Neck:      Thyroid: No thyromegaly.      Trachea: No tracheal deviation.   Cardiovascular:      Rate and Rhythm: " Normal rate and regular rhythm.      Heart sounds: No murmur heard.     Pulmonary:      Effort: Pulmonary effort is normal. No respiratory distress.      Breath sounds: Normal breath sounds. No stridor. No wheezing.   Abdominal:      General: Bowel sounds are normal.      Palpations: Abdomen is soft.      Tenderness: There is abdominal tenderness.   Genitourinary:     Labia:         Right: No tenderness.         Left: No tenderness.       Vagina: Normal. No vaginal discharge.   Musculoskeletal:         General: Tenderness present. No deformity. Normal range of motion.      Cervical back: Normal range of motion.   Skin:     General: Skin is warm and dry.      Capillary Refill: Capillary refill takes less than 2 seconds.      Coloration: Skin is not pale.      Findings: No erythema or rash.   Neurological:      Mental Status: She is alert and oriented to person, place, and time.      Cranial Nerves: No cranial nerve deficit.      Sensory: No sensory deficit.      Coordination: Coordination normal.   Psychiatric:         Behavior: Behavior normal.         Thought Content: Thought content normal.         Judgment: Judgment normal.        Result Review :     UA    Urinalysis 12/14/20 12/30/20 4/6/21   Ketones, UA Negative Negative Negative   Leukocytes, UA Negative Negative Negative           Urine Culture    Urine Culture 12/14/20   Urine Culture No growth           Data reviewed: Radiologic studies CT abdomen and pelvis done 12/17/2020          Assessment and Plan    Diagnoses and all orders for this visit:    1. Frequency of urination (Primary)  -     POC Urinalysis Dipstick, Automated  -     conjugated estrogens (PREMARIN) 0.625 MG/GM vaginal cream; DO NOT USE SYRINGE. APPLY PEA SIZE TO INDEX FINGER, APPLY TO VAGINA TWICE WEEKLY.  Dispense: 30 g; Refill: 2  -     nitrofurantoin, macrocrystal-monohydrate, (Macrobid) 100 MG capsule; Take 1 capsule by mouth Daily.  Dispense: 56 capsule; Refill: 5  -     promethazine  (PHENERGAN) 25 MG tablet; Take 1 tablet by mouth Every 8 (Eight) Hours As Needed for Nausea or Vomiting.  Dispense: 20 tablet; Refill: 0  -     phenazopyridine (Pyridium) 200 MG tablet; Take 1 tablet by mouth 3 (Three) Times a Day As Needed for Bladder Spasms.  Dispense: 20 tablet; Refill: 2    2. Atrophic vaginitis  -     conjugated estrogens (PREMARIN) 0.625 MG/GM vaginal cream; DO NOT USE SYRINGE. APPLY PEA SIZE TO INDEX FINGER, APPLY TO VAGINA TWICE WEEKLY.  Dispense: 30 g; Refill: 2  -     nitrofurantoin, macrocrystal-monohydrate, (Macrobid) 100 MG capsule; Take 1 capsule by mouth Daily.  Dispense: 56 capsule; Refill: 5  -     promethazine (PHENERGAN) 25 MG tablet; Take 1 tablet by mouth Every 8 (Eight) Hours As Needed for Nausea or Vomiting.  Dispense: 20 tablet; Refill: 0  -     phenazopyridine (Pyridium) 200 MG tablet; Take 1 tablet by mouth 3 (Three) Times a Day As Needed for Bladder Spasms.  Dispense: 20 tablet; Refill: 2    3. Frequency of micturition  -     conjugated estrogens (PREMARIN) 0.625 MG/GM vaginal cream; DO NOT USE SYRINGE. APPLY PEA SIZE TO INDEX FINGER, APPLY TO VAGINA TWICE WEEKLY.  Dispense: 30 g; Refill: 2  -     nitrofurantoin, macrocrystal-monohydrate, (Macrobid) 100 MG capsule; Take 1 capsule by mouth Daily.  Dispense: 56 capsule; Refill: 5  -     promethazine (PHENERGAN) 25 MG tablet; Take 1 tablet by mouth Every 8 (Eight) Hours As Needed for Nausea or Vomiting.  Dispense: 20 tablet; Refill: 0  -     phenazopyridine (Pyridium) 200 MG tablet; Take 1 tablet by mouth 3 (Three) Times a Day As Needed for Bladder Spasms.  Dispense: 20 tablet; Refill: 2    4. Recurrent UTI  -     conjugated estrogens (PREMARIN) 0.625 MG/GM vaginal cream; DO NOT USE SYRINGE. APPLY PEA SIZE TO INDEX FINGER, APPLY TO VAGINA TWICE WEEKLY.  Dispense: 30 g; Refill: 2  -     nitrofurantoin, macrocrystal-monohydrate, (Macrobid) 100 MG capsule; Take 1 capsule by mouth Daily.  Dispense: 56 capsule; Refill: 5  -      promethazine (PHENERGAN) 25 MG tablet; Take 1 tablet by mouth Every 8 (Eight) Hours As Needed for Nausea or Vomiting.  Dispense: 20 tablet; Refill: 0  -     phenazopyridine (Pyridium) 200 MG tablet; Take 1 tablet by mouth 3 (Three) Times a Day As Needed for Bladder Spasms.  Dispense: 20 tablet; Refill: 2    5. Nausea without vomiting  -     promethazine (PHENERGAN) 25 MG tablet; Take 1 tablet by mouth Every 8 (Eight) Hours As Needed for Nausea or Vomiting.  Dispense: 20 tablet; Refill: 0  -     phenazopyridine (Pyridium) 200 MG tablet; Take 1 tablet by mouth 3 (Three) Times a Day As Needed for Bladder Spasms.  Dispense: 20 tablet; Refill: 2      Recurrent urinary tract infections/atrophic vaginitis: She is in no apparent distress and reports a remarkable improvement I her overall symptoms. She is happy to be feeling better she states.  She reports doing relatively better on her antibiotic prophylaxis, and would like to continue.     Again,  We discussed the types of organisms that are found in the urinary tract indicating that the vast majority are results of the patient's own gastrointestinal familia.  We discussed how many of the antibiotics that are utilized can actually exacerbate these infections by creating resistant organisms and there is only a very few antibiotics that are concentrated in the urine and do not affect the rectal reservoir nor cause recurrent yeast vaginitis.      We discussed the risk factors for recurrent infections being intercourse in younger patients and atrophic changes in older patients.  We discussed the symptoms that are found including pain, pressure, burning, frequency, urgency suprapubic pain and painful intercourse.  I discussed upper tract symptoms including fevers, chills, and indicated the workup would be much more aggressive if the patient were to present with recurrent infections in the face of upper tract symptomatology such as fever. I discussed the history of vesicoureteral  reflux in young patients and finally chronic renal scarring as a result of such.         PLAN  We resent her urine for culture. I will call her with results if any positive bacteria growth not sensitive to current therapy on Macrobid..    Will Continue Macrobid 100 mg PO Daily -Suppressive Therapy. She should increase her p.o. fluid intake to at least 1 to 2 L daily and avoid bladder irritants such as caffeine products, spicy foods, and citrusy foods.     I recommend concomitant probiotics with treatment with antibiotics to protect the rectal reservoir including over-the-counter yogurt preparations to rachel oral pills containing the appropriate probiotics. Patient reports the diligent use of Probiotics.    She is to also continue Pyridium as needed for bladder spasms.    Will see her back in 6 months for Follow up in clinic and recheck her urinalysis at that time.    Patient is agreeable to plan of care.        Follow Up   Return in about 6 months (around 10/6/2021) for Next scheduled follow up.  Patient was given instructions and counseling regarding her condition or for health maintenance advice. Please see specific information pulled into the AVS if appropriate.

## 2021-10-05 ENCOUNTER — OFFICE VISIT (OUTPATIENT)
Dept: UROLOGY | Facility: CLINIC | Age: 61
End: 2021-10-05

## 2021-10-05 VITALS — HEIGHT: 61 IN | WEIGHT: 167 LBS | BODY MASS INDEX: 31.53 KG/M2

## 2021-10-05 DIAGNOSIS — N95.2 ATROPHIC VAGINITIS: ICD-10-CM

## 2021-10-05 DIAGNOSIS — R11.0 NAUSEA WITHOUT VOMITING: ICD-10-CM

## 2021-10-05 DIAGNOSIS — R35.0 FREQUENCY OF MICTURITION: ICD-10-CM

## 2021-10-05 DIAGNOSIS — N39.0 RECURRENT UTI: ICD-10-CM

## 2021-10-05 DIAGNOSIS — R35.0 FREQUENCY OF URINATION: Primary | ICD-10-CM

## 2021-10-05 LAB
BILIRUB BLD-MCNC: NEGATIVE MG/DL
CLARITY, POC: CLEAR
COLOR UR: YELLOW
GLUCOSE UR STRIP-MCNC: NEGATIVE MG/DL
KETONES UR QL: NEGATIVE
LEUKOCYTE EST, POC: NEGATIVE
NITRITE UR-MCNC: NEGATIVE MG/ML
PH UR: 6 [PH] (ref 5–8)
PROT UR STRIP-MCNC: NEGATIVE MG/DL
RBC # UR STRIP: NEGATIVE /UL
SP GR UR: 1.01 (ref 1–1.03)
UROBILINOGEN UR QL: NORMAL

## 2021-10-05 PROCEDURE — 81003 URINALYSIS AUTO W/O SCOPE: CPT | Performed by: NURSE PRACTITIONER

## 2021-10-05 PROCEDURE — 87086 URINE CULTURE/COLONY COUNT: CPT | Performed by: NURSE PRACTITIONER

## 2021-10-05 PROCEDURE — 99214 OFFICE O/P EST MOD 30 MIN: CPT | Performed by: NURSE PRACTITIONER

## 2021-10-05 RX ORDER — PHENAZOPYRIDINE HYDROCHLORIDE 200 MG/1
200 TABLET, FILM COATED ORAL 3 TIMES DAILY PRN
Qty: 20 TABLET | Refills: 2 | Status: SHIPPED | OUTPATIENT
Start: 2021-10-05

## 2021-10-05 RX ORDER — PROMETHAZINE HYDROCHLORIDE 25 MG/1
25 TABLET ORAL EVERY 8 HOURS PRN
Qty: 20 TABLET | Refills: 0 | Status: SHIPPED | OUTPATIENT
Start: 2021-10-05

## 2021-10-05 RX ORDER — NITROFURANTOIN 25; 75 MG/1; MG/1
100 CAPSULE ORAL DAILY
Qty: 56 CAPSULE | Refills: 5 | Status: SHIPPED | OUTPATIENT
Start: 2021-10-05

## 2021-10-05 NOTE — PATIENT INSTRUCTIONS
Atrophic Vaginitis    Atrophic vaginitis is when the lining of the vagina becomes dry and thin.  This is most common in women who have stopped having their periods (are in menopause). It usually starts when a woman is 45 to 55 years old.  What are the causes?  This condition is caused by a drop in a female hormone (estrogen).  What increases the risk?  You are more likely to develop this condition if:  · You take certain medicines.  · You have had your ovaries taken out.  · You are being treated for cancer.  · You have given birth or are breastfeeding.  · You are more than 50 years old.  · You smoke.  What are the signs or symptoms?  · Pain during sex.  · A feeling of pressure during sex.  · Bleeding during sex.  · Burning or itching in the vagina.  · Burning pain when you pee (urinate).  · Fluid coming from your vagina.  Some people do not have symptoms.  How is this treated?  · Using a lubricant before sex.  · Using a moisturizer in the vagina.  · Using estrogen in the vagina.  In some cases, you may not need treatment.  Follow these instructions at home:  Medicines  · Take all medicines only as told by your doctor. This includes medicines for dryness.  · Do not use herbal medicines unless your doctor says it is okay.  General instructions  · Talk with your doctor about treatment.  · Do not douche.  · Do not use scented:  ? Sprays.  ? Tampons.  ? Soaps.  · If sex hurts, try using lubricants right before you have sex.  Contact a doctor if:  · You have fluid coming from the vagina that is not like normal.  · You have a bad smell coming from your vagina.  · You have new symptoms.  · Your symptoms do not get better when treated.  · Your symptoms get worse.  Summary  · This condition happens when the lining of the vagina becomes dry and thin.  · It is most common in women who no longer have periods.  · Treatment may include using medicines for dryness.  · Call a doctor if your symptoms do not get better.  This  information is not intended to replace advice given to you by your health care provider. Make sure you discuss any questions you have with your health care provider.  Document Revised: 06/17/2021 Document Reviewed: 06/17/2021  [employer] Patient Education © 2021 [employer]  Dysuria  Dysuria is pain or discomfort while urinating. The pain or discomfort may be felt in the part of your body that drains urine from the bladder (urethra) or in the surrounding tissue of the genitals. The pain may also be felt in the groin area, lower abdomen, or lower back. You may have to urinate frequently or have the sudden feeling that you have to urinate (urgency). Dysuria can affect both men and women, but it is more common in women.  Dysuria can be caused by many different things, including:  · Urinary tract infection.  · Kidney stones or bladder stones.  · Certain sexually transmitted infections (STIs), such as chlamydia.  · Dehydration.  · Inflammation of the tissues of the vagina.  · Use of certain medicines.  · Use of certain soaps or scented products that cause irritation.  Follow these instructions at home:  General instructions  · Watch your condition for any changes.  · Urinate often. Avoid holding urine for long periods of time.  · After a bowel movement or urination, women should cleanse from front to back, using each tissue only once.  · Urinate after sexual intercourse.  · Keep all follow-up visits as told by your health care provider. This is important.  · If you had any tests done to find the cause of dysuria, it is up to you to get your test results. Ask your health care provider, or the department that is doing the test, when your results will be ready.  Eating and drinking    · Drink enough fluid to keep your urine pale yellow.  · Avoid caffeine, tea, and alcohol. They can irritate the bladder and make dysuria worse. In men, alcohol may irritate the prostate.    Medicines  · Take over-the-counter and prescription  medicines only as told by your health care provider.  · If you were prescribed an antibiotic medicine, take it as told by your health care provider. Do not stop taking the antibiotic even if you start to feel better.  Contact a health care provider if:  · You have a fever.  · You develop pain in your back or sides.  · You have nausea or vomiting.  · You have blood in your urine.  · You are not urinating as often as you usually do.  Get help right away if:  · Your pain is severe and not relieved with medicines.  · You cannot eat or drink without vomiting.  · You are confused.  · You have a rapid heartbeat while at rest.  · You have shaking or chills.  · You feel extremely weak.  Summary  · Dysuria is pain or discomfort while urinating. Many different conditions can lead to dysuria.  · If you have dysuria, you may have to urinate frequently or have the sudden feeling that you have to urinate (urgency).  · Watch your condition for any changes. Keep all follow-up visits as told by your health care provider.  · Make sure that you urinate often and drink enough fluid to keep your urine pale yellow.  This information is not intended to replace advice given to you by your health care provider. Make sure you discuss any questions you have with your health care provider.  Document Revised: 11/30/2018 Document Reviewed: 10/04/2018  [employer] Patient Education © 2021 [employer]  Urinary Tract Infection, Adult  A urinary tract infection (UTI) is an infection of any part of the urinary tract. The urinary tract includes:  · The kidneys.  · The ureters.  · The bladder.  · The urethra.  These organs make, store, and get rid of pee (urine) in the body.  What are the causes?  This is caused by germs (bacteria) in your genital area. These germs grow and cause swelling (inflammation) of your urinary tract.  What increases the risk?  You are more likely to develop this condition if:  · You have a small, thin tube (catheter) to drain  pee.  · You cannot control when you pee or poop (incontinence).  · You are female, and:  ? You use these methods to prevent pregnancy:  § A medicine that kills sperm (spermicide).  § A device that blocks sperm (diaphragm).  ? You have low levels of a female hormone (estrogen).  ? You are pregnant.  · You have genes that add to your risk.  · You are sexually active.  · You take antibiotic medicines.  · You have trouble peeing because of:  ? A prostate that is bigger than normal, if you are male.  ? A blockage in the part of your body that drains pee from the bladder (urethra).  ? A kidney stone.  ? A nerve condition that affects your bladder (neurogenic bladder).  ? Not getting enough to drink.  ? Not peeing often enough.  · You have other conditions, such as:  ? Diabetes.  ? A weak disease-fighting system (immune system).  ? Sickle cell disease.  ? Gout.  ? Injury of the spine.  What are the signs or symptoms?  Symptoms of this condition include:  · Needing to pee right away (urgently).  · Peeing often.  · Peeing small amounts often.  · Pain or burning when peeing.  · Blood in the pee.  · Pee that smells bad or not like normal.  · Trouble peeing.  · Pee that is cloudy.  · Fluid coming from the vagina, if you are female.  · Pain in the belly or lower back.  Other symptoms include:  · Throwing up (vomiting).  · No urge to eat.  · Feeling mixed up (confused).  · Being tired and grouchy (irritable).  · A fever.  · Watery poop (diarrhea).  How is this treated?  This condition may be treated with:  · Antibiotic medicine.  · Other medicines.  · Drinking enough water.  Follow these instructions at home:    Medicines  · Take over-the-counter and prescription medicines only as told by your doctor.  · If you were prescribed an antibiotic medicine, take it as told by your doctor. Do not stop taking it even if you start to feel better.  General instructions  · Make sure you:  ? Pee until your bladder is empty.  ? Do not hold pee  for a long time.  ? Empty your bladder after sex.  ? Wipe from front to back after pooping if you are a female. Use each tissue one time when you wipe.  · Drink enough fluid to keep your pee pale yellow.  · Keep all follow-up visits as told by your doctor. This is important.  Contact a doctor if:  · You do not get better after 1-2 days.  · Your symptoms go away and then come back.  Get help right away if:  · You have very bad back pain.  · You have very bad pain in your lower belly.  · You have a fever.  · You are sick to your stomach (nauseous).  · You are throwing up.  Summary  · A urinary tract infection (UTI) is an infection of any part of the urinary tract.  · This condition is caused by germs in your genital area.  · There are many risk factors for a UTI. These include having a small, thin tube to drain pee and not being able to control when you pee or poop.  · Treatment includes antibiotic medicines for germs.  · Drink enough fluid to keep your pee pale yellow.  This information is not intended to replace advice given to you by your health care provider. Make sure you discuss any questions you have with your health care provider.  Document Revised: 12/05/2019 Document Reviewed: 06/27/2019  [employer] Patient Education © 2021 [employer]

## 2021-10-05 NOTE — PROGRESS NOTES
"Chief Complaint  Urinary Frequency (6 month fu ) and Urinary Tract Infection    Subjective          Antoinette Leigh presents to [facility]  History of Present Illness    Ms. Antoinette Leigh is a very pleasant 60-year-old female Patient returns to clinic for evaluation today.  This is her 6-month  follow-up for recurrent urinary tract infections and atrophic vaginitis. She reports currently doing better on antibiotic prophylaxis with Macrobid.  She also reports  A significant  improvement in her urinary symptoms.  She denies having any more episodes of frequency, urgency, or nocturia. She denies dysuria, burning on urination, or gross hematuria.  Denies back pain, pelvic pain and pressure, she denies fever or chills, she does not have nausea, vomiting, or diarrhea.  Her Urine dipstick today is completely negative for any infection, she has trace microscopic hematuria, negative for gross hematuria.       Overall she reports doing better, she states she is currently taking her antibiotics and probiotics diligently.  She is also utilizing her Premarin for atrophic vaginitis and reports a significant improvement in her prior dyspareunia symptoms which are now significantly resolved. She reports  increasing her p.o. fluid intake to at least 2 L daily, and has significantly reduced her intake of soda drinks. She drinks more water and cranberry drinks.       Objective   Vital Signs:   Ht 154.9 cm (61\")   Wt 75.8 kg (167 lb)   BMI 31.55 kg/m²     Physical Exam  Constitutional:       General: She is not in acute distress.     Appearance: She is well-developed. She is obese.   HENT:      Head: Normocephalic and atraumatic.   Eyes:      Pupils: Pupils are equal, round, and reactive to light.   Neck:      Thyroid: No thyromegaly.      Trachea: No tracheal deviation.   Cardiovascular:      Rate and Rhythm: Normal rate and regular rhythm.      Heart sounds: No murmur heard. "     Pulmonary:      Effort: Pulmonary effort is normal. No respiratory distress.      Breath sounds: Normal breath sounds. No stridor. No wheezing.   Abdominal:      General: Bowel sounds are normal.      Palpations: Abdomen is soft.      Tenderness: There is abdominal tenderness.   Genitourinary:     Labia:         Right: No tenderness.         Left: No tenderness.       Vagina: Normal. No vaginal discharge.      Comments: Atrophic vaginitis  Musculoskeletal:         General: Tenderness present. No deformity. Normal range of motion.      Cervical back: Normal range of motion.   Skin:     General: Skin is warm and dry.      Capillary Refill: Capillary refill takes less than 2 seconds.      Coloration: Skin is not pale.      Findings: No erythema or rash.   Neurological:      Mental Status: She is alert and oriented to person, place, and time.      Cranial Nerves: No cranial nerve deficit.      Sensory: No sensory deficit.      Coordination: Coordination normal.   Psychiatric:         Behavior: Behavior normal.         Thought Content: Thought content normal.         Judgment: Judgment normal.        Result Review :     UA    Urinalysis 12/30/20 4/6/21 10/5/21   Ketones, UA Negative Negative Negative   Leukocytes, UA Negative Negative Negative           Urine Culture    Urine Culture 12/14/20 10/5/21   Urine Culture No growth No growth                     Assessment and Plan    Diagnoses and all orders for this visit:    1. Frequency of urination (Primary)  -     POC Urinalysis Dipstick, Automated  -     Urine Culture - Urine, Urine, Clean Catch  -     phenazopyridine (Pyridium) 200 MG tablet; Take 1 tablet by mouth 3 (Three) Times a Day As Needed for Bladder Spasms.  Dispense: 20 tablet; Refill: 2  -     promethazine (PHENERGAN) 25 MG tablet; Take 1 tablet by mouth Every 8 (Eight) Hours As Needed for Nausea or Vomiting.  Dispense: 20 tablet; Refill: 0  -     nitrofurantoin, macrocrystal-monohydrate, (Macrobid) 100  MG capsule; Take 1 capsule by mouth Daily.  Dispense: 56 capsule; Refill: 5  -     conjugated estrogens (PREMARIN) 0.625 MG/GM vaginal cream; DO NOT USE SYRINGE. APPLY PEA SIZE TO INDEX FINGER, APPLY TO VAGINA TWICE WEEKLY.  Dispense: 30 g; Refill: 2    2. Atrophic vaginitis  -     phenazopyridine (Pyridium) 200 MG tablet; Take 1 tablet by mouth 3 (Three) Times a Day As Needed for Bladder Spasms.  Dispense: 20 tablet; Refill: 2  -     promethazine (PHENERGAN) 25 MG tablet; Take 1 tablet by mouth Every 8 (Eight) Hours As Needed for Nausea or Vomiting.  Dispense: 20 tablet; Refill: 0  -     nitrofurantoin, macrocrystal-monohydrate, (Macrobid) 100 MG capsule; Take 1 capsule by mouth Daily.  Dispense: 56 capsule; Refill: 5  -     conjugated estrogens (PREMARIN) 0.625 MG/GM vaginal cream; DO NOT USE SYRINGE. APPLY PEA SIZE TO INDEX FINGER, APPLY TO VAGINA TWICE WEEKLY.  Dispense: 30 g; Refill: 2    3. Frequency of micturition  -     phenazopyridine (Pyridium) 200 MG tablet; Take 1 tablet by mouth 3 (Three) Times a Day As Needed for Bladder Spasms.  Dispense: 20 tablet; Refill: 2  -     promethazine (PHENERGAN) 25 MG tablet; Take 1 tablet by mouth Every 8 (Eight) Hours As Needed for Nausea or Vomiting.  Dispense: 20 tablet; Refill: 0  -     nitrofurantoin, macrocrystal-monohydrate, (Macrobid) 100 MG capsule; Take 1 capsule by mouth Daily.  Dispense: 56 capsule; Refill: 5  -     conjugated estrogens (PREMARIN) 0.625 MG/GM vaginal cream; DO NOT USE SYRINGE. APPLY PEA SIZE TO INDEX FINGER, APPLY TO VAGINA TWICE WEEKLY.  Dispense: 30 g; Refill: 2    4. Recurrent UTI  -     phenazopyridine (Pyridium) 200 MG tablet; Take 1 tablet by mouth 3 (Three) Times a Day As Needed for Bladder Spasms.  Dispense: 20 tablet; Refill: 2  -     promethazine (PHENERGAN) 25 MG tablet; Take 1 tablet by mouth Every 8 (Eight) Hours As Needed for Nausea or Vomiting.  Dispense: 20 tablet; Refill: 0  -     nitrofurantoin, macrocrystal-monohydrate,  (Macrobid) 100 MG capsule; Take 1 capsule by mouth Daily.  Dispense: 56 capsule; Refill: 5  -     conjugated estrogens (PREMARIN) 0.625 MG/GM vaginal cream; DO NOT USE SYRINGE. APPLY PEA SIZE TO INDEX FINGER, APPLY TO VAGINA TWICE WEEKLY.  Dispense: 30 g; Refill: 2    5. Nausea without vomiting  -     phenazopyridine (Pyridium) 200 MG tablet; Take 1 tablet by mouth 3 (Three) Times a Day As Needed for Bladder Spasms.  Dispense: 20 tablet; Refill: 2  -     promethazine (PHENERGAN) 25 MG tablet; Take 1 tablet by mouth Every 8 (Eight) Hours As Needed for Nausea or Vomiting.  Dispense: 20 tablet; Refill: 0      Recurrent urinary tract infections/Atrophic vaginitis: She is in no apparent distress and reports a remarkable improvement I her overall symptoms. She is happy to be feeling better she states. She reports doing relatively better on her antibiotic prophylaxis, and would like to continue.      Again,  We discussed the types of organisms that are found in the urinary tract indicating that the vast majority are results of the patient's own gastrointestinal familia.  We discussed how many of the antibiotics that are utilized can actually exacerbate these infections by creating resistant organisms and there is only a very few antibiotics that are concentrated in the urine and do not affect the rectal reservoir nor cause recurrent yeast vaginitis.       We discussed the risk factors for recurrent infections being intercourse in younger patients and atrophic changes in older patients.  We discussed the symptoms that are found including pain, pressure, burning, frequency, urgency suprapubic pain and painful intercourse.  I discussed upper tract symptoms including fevers, chills, and indicated the workup would be much more aggressive if the patient were to present with recurrent infections in the face of upper tract symptomatology such as fever. I discussed the history of vesicoureteral reflux in young patients and finally  chronic renal scarring as a result of such.                                                      PLAN  We resent her urine for culture. I will call her with results if any positive bacteria growth not sensitive to current therapy on Macrobid..     Will Continue Macrobid 100 mg PO Daily -Suppressive Therapy. She should increase her p.o. fluid intake to at least 1 to 2 L daily and avoid bladder irritants such as caffeine products, spicy foods, and citrusy foods.  We discussed weaning of medications by next appointment.      I recommend concomitant probiotics with treatment with antibiotics to protect the rectal reservoir including over-the-counter yogurt preparations to rachel oral pills containing the appropriate probiotics. Patient reports the diligent use of Probiotics.     She is to also continue Pyridium as needed for bladder spasms.     Will see her back  for Follow up in clinic and recheck her urinalysis at that time.     Patient is agreeable to plan of care.      Follow Up   Return in about 1 year (around 10/5/2022) for Next scheduled follow up/UTIs/ATROPHIC VAG/ DYSURIA.  Patient was given instructions and counseling regarding her condition or for health maintenance advice. Please see specific information pulled into the AVS if appropriate.

## 2021-10-06 LAB — BACTERIA SPEC AEROBE CULT: NO GROWTH

## 2021-10-07 ENCOUNTER — TELEPHONE (OUTPATIENT)
Dept: UROLOGY | Facility: CLINIC | Age: 61
End: 2021-10-07

## 2021-10-07 DIAGNOSIS — N95.2 ATROPHIC VAGINITIS: Primary | ICD-10-CM

## 2021-10-07 NOTE — TELEPHONE ENCOUNTER
----- Message from Griselda Cheng-Akwa, APRN sent at 10/7/2021 12:57 PM EDT -----  Please call patient with negative urine culture results.  Follow-up in clinic as discussed thank you  ----- Message -----  From: Sindy Funes MA  Sent: 10/5/2021   1:05 PM EDT  To: Griselda Cheng-Akwa, APRN

## 2021-10-08 RX ORDER — ESTRADIOL 0.1 MG/G
CREAM VAGINAL
Qty: 42.5 G | Refills: 12 | Status: SHIPPED | OUTPATIENT
Start: 2021-10-08

## 2024-11-01 ENCOUNTER — TRANSCRIBE ORDERS (OUTPATIENT)
Dept: ADMINISTRATIVE | Facility: HOSPITAL | Age: 64
End: 2024-11-01
Payer: MEDICARE

## 2024-11-01 DIAGNOSIS — R92.8 ABNORMAL MAMMOGRAM: Primary | ICD-10-CM

## 2024-11-13 ENCOUNTER — TRANSCRIBE ORDERS (OUTPATIENT)
Dept: LAB | Facility: HOSPITAL | Age: 64
End: 2024-11-13
Payer: MEDICARE

## 2024-11-13 ENCOUNTER — HOSPITAL ENCOUNTER (OUTPATIENT)
Dept: MAMMOGRAPHY | Facility: HOSPITAL | Age: 64
Discharge: HOME | End: 2024-11-13
Payer: MEDICARE

## 2024-11-13 ENCOUNTER — LAB (OUTPATIENT)
Dept: LAB | Facility: HOSPITAL | Age: 64
End: 2024-11-13
Payer: MEDICARE

## 2024-11-13 DIAGNOSIS — R92.8 ABNORMAL MAMMOGRAM: ICD-10-CM

## 2024-11-13 DIAGNOSIS — R92.8 ABNORMAL MAMMOGRAM: Primary | ICD-10-CM

## 2024-11-13 LAB
ALBUMIN SERPL-MCNC: 4.5 G/DL (ref 3.5–5.2)
ALBUMIN/GLOB SERPL: 1.7 G/DL
ALP SERPL-CCNC: 80 U/L (ref 39–117)
ALT SERPL W P-5'-P-CCNC: 14 U/L (ref 1–33)
ANION GAP SERPL CALCULATED.3IONS-SCNC: 10.1 MMOL/L (ref 5–15)
AST SERPL-CCNC: 22 U/L (ref 1–32)
BILIRUB SERPL-MCNC: 0.8 MG/DL (ref 0–1.2)
BUN SERPL-MCNC: 10 MG/DL (ref 8–23)
BUN/CREAT SERPL: 9.6 (ref 7–25)
CALCIUM SPEC-SCNC: 9.9 MG/DL (ref 8.6–10.5)
CHLORIDE SERPL-SCNC: 101 MMOL/L (ref 98–107)
CO2 SERPL-SCNC: 26.9 MMOL/L (ref 22–29)
CREAT SERPL-MCNC: 1.04 MG/DL (ref 0.57–1)
DEPRECATED RDW RBC AUTO: 43.1 FL (ref 37–54)
EGFRCR SERPLBLD CKD-EPI 2021: 59.8 ML/MIN/1.73
ERYTHROCYTE [DISTWIDTH] IN BLOOD BY AUTOMATED COUNT: 13.3 % (ref 12.3–15.4)
GLOBULIN UR ELPH-MCNC: 2.6 GM/DL
GLUCOSE SERPL-MCNC: 112 MG/DL (ref 65–99)
HCT VFR BLD AUTO: 42 % (ref 34–46.6)
HGB BLD-MCNC: 13.7 G/DL (ref 12–15.9)
MCH RBC QN AUTO: 29.5 PG (ref 26.6–33)
MCHC RBC AUTO-ENTMCNC: 32.6 G/DL (ref 31.5–35.7)
MCV RBC AUTO: 90.3 FL (ref 79–97)
PLATELET # BLD AUTO: 246 10*3/MM3 (ref 140–450)
PMV BLD AUTO: 10.8 FL (ref 6–12)
POTASSIUM SERPL-SCNC: 4.7 MMOL/L (ref 3.5–5.2)
PROT SERPL-MCNC: 7.1 G/DL (ref 6–8.5)
RBC # BLD AUTO: 4.65 10*6/MM3 (ref 3.77–5.28)
SODIUM SERPL-SCNC: 138 MMOL/L (ref 136–145)
WBC NRBC COR # BLD AUTO: 4.88 10*3/MM3 (ref 3.4–10.8)

## 2024-11-13 PROCEDURE — 25010000002 LIDOCAINE 1 % SOLUTION: Performed by: RADIOLOGY

## 2024-11-13 PROCEDURE — A4648 IMPLANTABLE TISSUE MARKER: HCPCS

## 2024-11-13 PROCEDURE — 19283 PERQ DEV BREAST 1ST STRTCTC: CPT | Performed by: RADIOLOGY

## 2024-11-13 PROCEDURE — 80053 COMPREHEN METABOLIC PANEL: CPT

## 2024-11-13 PROCEDURE — 85027 COMPLETE CBC AUTOMATED: CPT | Performed by: SURGERY

## 2024-11-13 PROCEDURE — 77065 DX MAMMO INCL CAD UNI: CPT | Performed by: RADIOLOGY

## 2024-11-13 PROCEDURE — 36415 COLL VENOUS BLD VENIPUNCTURE: CPT

## 2024-11-13 RX ORDER — LIDOCAINE HYDROCHLORIDE 10 MG/ML
5 INJECTION, SOLUTION INFILTRATION; PERINEURAL ONCE
Status: COMPLETED | OUTPATIENT
Start: 2024-11-13 | End: 2024-11-13

## 2024-11-13 RX ADMIN — Medication 5 ML: at 09:49

## 2024-11-19 ENCOUNTER — LAB REQUISITION (OUTPATIENT)
Dept: LAB | Facility: HOSPITAL | Age: 64
End: 2024-11-19
Payer: MEDICARE

## 2024-11-19 ENCOUNTER — HOSPITAL ENCOUNTER (OUTPATIENT)
Dept: MAMMOGRAPHY | Facility: HOSPITAL | Age: 64
Discharge: HOME | End: 2024-11-19
Payer: MEDICARE

## 2024-11-19 DIAGNOSIS — R92.8 OTHER ABNORMAL AND INCONCLUSIVE FINDINGS ON DIAGNOSTIC IMAGING OF BREAST: ICD-10-CM

## 2024-11-19 DIAGNOSIS — R92.8 ABNORMAL MAMMOGRAM: ICD-10-CM

## 2024-11-19 PROCEDURE — 76098 X-RAY EXAM SURGICAL SPECIMEN: CPT

## 2024-11-19 PROCEDURE — 88342 IMHCHEM/IMCYTCHM 1ST ANTB: CPT | Performed by: SURGERY

## 2024-11-19 PROCEDURE — 88360 TUMOR IMMUNOHISTOCHEM/MANUAL: CPT | Performed by: SURGERY

## 2024-11-19 PROCEDURE — 88307 TISSUE EXAM BY PATHOLOGIST: CPT | Performed by: SURGERY

## 2024-11-22 LAB
CYTO UR: NORMAL
LAB AP CASE REPORT: NORMAL
LAB AP CLINICAL INFORMATION: NORMAL
LAB AP DIAGNOSIS COMMENT: NORMAL
LAB AP SPECIAL STAINS: NORMAL
PATH REPORT.FINAL DX SPEC: NORMAL
PATH REPORT.GROSS SPEC: NORMAL

## 2025-01-21 ENCOUNTER — LAB REQUISITION (OUTPATIENT)
Dept: LAB | Facility: HOSPITAL | Age: 65
End: 2025-01-21
Payer: MEDICARE

## 2025-01-21 DIAGNOSIS — D05.12 INTRADUCTAL CARCINOMA IN SITU OF LEFT BREAST: ICD-10-CM

## 2025-01-21 PROCEDURE — 88307 TISSUE EXAM BY PATHOLOGIST: CPT | Performed by: SURGERY

## 2025-01-21 PROCEDURE — 88305 TISSUE EXAM BY PATHOLOGIST: CPT | Performed by: SURGERY

## 2025-01-24 LAB
CYTO UR: NORMAL
LAB AP CASE REPORT: NORMAL
LAB AP CLINICAL INFORMATION: NORMAL
PATH REPORT.FINAL DX SPEC: NORMAL
PATH REPORT.GROSS SPEC: NORMAL

## 2025-02-19 ENCOUNTER — TELEMEDICINE (OUTPATIENT)
Dept: ONCOLOGY | Facility: CLINIC | Age: 65
End: 2025-02-19
Payer: MEDICARE

## 2025-02-19 DIAGNOSIS — D05.12 DUCTAL CARCINOMA IN SITU (DCIS) OF LEFT BREAST: Primary | ICD-10-CM

## 2025-02-19 RX ORDER — TAMOXIFEN CITRATE 10 MG/1
5 TABLET ORAL DAILY
Qty: 15 TABLET | Refills: 11 | Status: SHIPPED | OUTPATIENT
Start: 2025-02-19

## 2025-02-19 NOTE — LETTER
February 19, 2025     Blaine Romero DO  [address]  [address]  [place] [address]    Patient: Antoinette Leigh   YOB: 1960   Date of Visit: 2/19/2025       Dear Blaine Romero,     Antoinette Leigh was in my office today. Below is a copy of my note.    If you have questions, please do not hesitate to call me. I look forward to following Antoinette along with you.         Sincerely,        Becca Kincaid MD        CC: Rob Black MD    You have chosen to receive care through a telehealth visit.  Do you consent to use a video/audio connection for your medical care today? Yes    Patient location: home  Provider location: office    Subjective    PROBLEM LIST:  [id number] ER+ [place]+ DCIS of the left breast  Left breast lumpectomy on 11/9/24.  Pathology showed a low grade DCIS measuring 1.2 cm.  Involved margins.  Reexcision on 1/21/25, with pathology showing residual DCIS within 0.1 mm of new surgical margin.    CHIEF COMPLAINT: DCIS      HISTORY OF PRESENT ILLNESS:  The patient is a 64 y.o. female, referred for evaluation of DCIS of the left breast.    She has had 2 surgeries.  She presents to discuss adjuvant therapy.    REVIEW OF SYSTEMS:  A 14 point review of systems was performed and is negative except as noted above.    Past Medical History:   Diagnosis Date   • Arthritis    • Urinary tract infection              Objective    There were no vitals taken for this visit.  Performance Status:0              General: well appearing female in no acute distress  Neuro: alert and oriented  HEENT: sclera anicteric, oropharynx clear  Lymphatics: No masses by inspection  Lungs: Respiratory effort appears normal  Skin: no rashes, lesions, bruising, or petechiae  Psych: mood and affect appropriate      Lab Results   Component Value Date    WBC 4.88 11/13/2024    HGB 13.7 11/13/2024    HCT 42.0 11/13/2024    MCV 90.3 11/13/2024     11/13/2024     Lab Results   Component Value Date    GLUCOSE 112 (H)  11/13/2024    BUN 10 11/13/2024    CREATININE 1.04 (H) 11/13/2024    BCR 9.6 11/13/2024    K 4.7 11/13/2024    CO2 26.9 11/13/2024    CALCIUM 9.9 11/13/2024    ALBUMIN 4.5 11/13/2024    AST 22 11/13/2024    ALT 14 11/13/2024       Mammo Breast Specimen  ASHLEY  SPECIMEN RADIOGRAPH LEFT BREAST     Specimen radiograph was received from a Ashley  localization  performed today by Dr. Black. The patient had undergone ASHLEY   reflector placement into a biopsy site where stereotactic core biopsy  yielded a diagnosis of focal atypical ductal hyperplasia.. Specimen  radiograph demonstrates the Ashley  reflector, marking clip and  residual microcalcifications in the specimen. Report called to Dr. Black in the operating room..     Pathology: Ductal carcinoma in situ, low-grade ductal carcinoma in situ  extends to the and oriented margin of the specimen. No invasive  carcinoma identified. Microcalcifications present associated with ductal  carcinoma in situ and benign fibrocystic changes.     Result is concordant     Recommendation: Surgical follow-up. In light of positive margins breast  MRI is suggested to evaluate extent of disease in the left breast and to  screen the contralateral right breast.     This report was finalized on 11/25/2024 12:33 PM by Dr. Yojana Mata MD.           3    ASSESSMENT AND PLAN:     Antoinette Leigh is a 64 y.o. female with ER+ DCIS of the left breast.      We discussed the finding of DCIS in the breast.  I told her that DCIS is not truly a cancer as it has not invaded into the tissues.  It does not have the potential to spread to lymph nodes or other parts of the body.  However, if untreated DCIS does have the potential to turn into an invasive cancer.  For this reason we treat it in much the same way that we treat invasive cancers, with surgery and radiation.  We also discussed the option of adjuvant hormonal therapy.  Hormone therapy in this setting can reduce the risk of  recurrent DCIS or invasive cancer in both breasts.  It does not change the risk of dying from breast cancer.    She would like to do radiation closer to home and I will make a referral for her to Rad Onc in [place].    She is interested in trying low dose tamoxifen.  She can start this after completing radiation.  If she tolerates it well we will plan to treat for a total of 3 years.    F/u in [facility] in June.  If she is doing well then we will see her once yearly.             A total greater than 60 mins minutes was spent in face to face patient time, examination, counseling, charting, reviewing test results, and reviewing outside records.    Becca Kincaid MD    2/19/2025

## 2025-02-19 NOTE — PROGRESS NOTES
You have chosen to receive care through a telehealth visit.  Do you consent to use a video/audio connection for your medical care today? Yes    Patient location: home  Provider location: office    Subjective     PROBLEM LIST:  [id number] ER+ [place]+ DCIS of the left breast  Left breast lumpectomy on 11/9/24.  Pathology showed a low grade DCIS measuring 1.2 cm.  Involved margins.  Reexcision on 1/21/25, with pathology showing residual DCIS within 0.1 mm of new surgical margin.    CHIEF COMPLAINT: DCIS      HISTORY OF PRESENT ILLNESS:  The patient is a 64 y.o. female, referred for evaluation of DCIS of the left breast.    She has had 2 surgeries.  She presents to discuss adjuvant therapy.    REVIEW OF SYSTEMS:  A 14 point review of systems was performed and is negative except as noted above.    Past Medical History:   Diagnosis Date    Arthritis     Urinary tract infection              Objective     There were no vitals taken for this visit.  Performance Status:0              General: well appearing female in no acute distress  Neuro: alert and oriented  HEENT: sclera anicteric, oropharynx clear  Lymphatics: No masses by inspection  Lungs: Respiratory effort appears normal  Skin: no rashes, lesions, bruising, or petechiae  Psych: mood and affect appropriate      Lab Results   Component Value Date    WBC 4.88 11/13/2024    HGB 13.7 11/13/2024    HCT 42.0 11/13/2024    MCV 90.3 11/13/2024     11/13/2024     Lab Results   Component Value Date    GLUCOSE 112 (H) 11/13/2024    BUN 10 11/13/2024    CREATININE 1.04 (H) 11/13/2024    BCR 9.6 11/13/2024    K 4.7 11/13/2024    CO2 26.9 11/13/2024    CALCIUM 9.9 11/13/2024    ALBUMIN 4.5 11/13/2024    AST 22 11/13/2024    ALT 14 11/13/2024       Mammo Breast Specimen  ASHLEY  SPECIMEN RADIOGRAPH LEFT BREAST     Specimen radiograph was received from a Ashley  localization  performed today by Dr. Black. The patient had undergone ASHLEY   reflector placement into a  biopsy site where stereotactic core biopsy  yielded a diagnosis of focal atypical ductal hyperplasia.. Specimen  radiograph demonstrates the Ashley  reflector, marking clip and  residual microcalcifications in the specimen. Report called to Dr. Black in the operating room..     Pathology: Ductal carcinoma in situ, low-grade ductal carcinoma in situ  extends to the and oriented margin of the specimen. No invasive  carcinoma identified. Microcalcifications present associated with ductal  carcinoma in situ and benign fibrocystic changes.     Result is concordant     Recommendation: Surgical follow-up. In light of positive margins breast  MRI is suggested to evaluate extent of disease in the left breast and to  screen the contralateral right breast.     This report was finalized on 11/25/2024 12:33 PM by Dr. Yojana Mata MD.           3    ASSESSMENT AND PLAN:     Antoinette Leigh is a 64 y.o. female with ER+ DCIS of the left breast.      We discussed the finding of DCIS in the breast.  I told her that DCIS is not truly a cancer as it has not invaded into the tissues.  It does not have the potential to spread to lymph nodes or other parts of the body.  However, if untreated DCIS does have the potential to turn into an invasive cancer.  For this reason we treat it in much the same way that we treat invasive cancers, with surgery and radiation.  We also discussed the option of adjuvant hormonal therapy.  Hormone therapy in this setting can reduce the risk of recurrent DCIS or invasive cancer in both breasts.  It does not change the risk of dying from breast cancer.    She would like to do radiation closer to home and I will make a referral for her to Rad Onc in [place].    She is interested in trying low dose tamoxifen.  She can start this after completing radiation.  If she tolerates it well we will plan to treat for a total of 3 years.    F/u in [facility] in June.  If she is doing well then we will see her  once yearly.             A total greater than 60 mins minutes was spent in face to face patient time, examination, counseling, charting, reviewing test results, and reviewing outside records.    Becca Kincaid MD    2/19/2025

## 2025-02-21 ENCOUNTER — TELEPHONE (OUTPATIENT)
Dept: ONCOLOGY | Facility: CLINIC | Age: 65
End: 2025-02-21
Payer: MEDICARE

## 2025-02-21 NOTE — TELEPHONE ENCOUNTER
Caller: CAMILA    Relationship:     Best call back number:     [phone]     Who are you requesting to speak with (clinical staff, provider,  specific staff member): CLINICAL     What was the call regarding: NEEDS THE OP NOTES AND PATH FOR THE LEFT BREAST LUMPECTOMY FROM 11/09 AS WELL AS THE LAB REQUISITION REPORT FROM 01/21     [phone]

## 2025-02-21 NOTE — TELEPHONE ENCOUNTER
I called Tiana back but got voicemail.  I left message that I was faxing path reports and note from Dr. Kincaid and would ask surgery to send the op note.

## 2025-06-17 ENCOUNTER — OFFICE VISIT (OUTPATIENT)
Dept: ONCOLOGY | Facility: CLINIC | Age: 65
End: 2025-06-17
Payer: MEDICARE

## 2025-06-17 VITALS
OXYGEN SATURATION: 97 % | HEIGHT: 61 IN | WEIGHT: 168 LBS | HEART RATE: 62 BPM | SYSTOLIC BLOOD PRESSURE: 145 MMHG | BODY MASS INDEX: 31.72 KG/M2 | RESPIRATION RATE: 16 BRPM | TEMPERATURE: 97.3 F | DIASTOLIC BLOOD PRESSURE: 81 MMHG

## 2025-06-17 DIAGNOSIS — D05.12 DUCTAL CARCINOMA IN SITU (DCIS) OF LEFT BREAST: Primary | ICD-10-CM

## 2025-06-17 PROCEDURE — 99214 OFFICE O/P EST MOD 30 MIN: CPT | Performed by: INTERNAL MEDICINE

## 2025-06-17 PROCEDURE — 1126F AMNT PAIN NOTED NONE PRSNT: CPT | Performed by: INTERNAL MEDICINE

## 2025-06-17 RX ORDER — DAPAGLIFLOZIN 10 MG/1
1 TABLET, FILM COATED ORAL DAILY
COMMUNITY
Start: 2025-06-12

## 2025-06-17 RX ORDER — FAMOTIDINE 20 MG/1
1 TABLET, FILM COATED ORAL EVERY 12 HOURS SCHEDULED
COMMUNITY
Start: 2025-05-12

## 2025-06-17 RX ORDER — CHOLECALCIFEROL (VITAMIN D3) 125 MCG
1 TABLET ORAL DAILY
COMMUNITY
Start: 2025-06-12

## 2025-06-17 RX ORDER — AMOXICILLIN 875 MG/1
1 TABLET, COATED ORAL EVERY 12 HOURS SCHEDULED
COMMUNITY
Start: 2025-06-07

## 2025-06-17 RX ORDER — ALENDRONATE SODIUM 70 MG/1
TABLET ORAL
COMMUNITY
Start: 2025-05-12

## 2025-06-17 RX ORDER — ATORVASTATIN CALCIUM 40 MG/1
1 TABLET, FILM COATED ORAL DAILY
COMMUNITY
Start: 2025-06-12

## 2025-06-17 RX ORDER — DEXLANSOPRAZOLE 30 MG/1
CAPSULE, DELAYED RELEASE ORAL
COMMUNITY
Start: 2025-05-21

## 2025-06-17 NOTE — PROGRESS NOTES
"      PROBLEM LIST:  [id number] ER+ [place]+ DCIS of the left breast  Left breast lumpectomy on 11/9/24.  Pathology showed a low grade DCIS measuring 1.2 cm.  Involved margins.  Reexcision on 1/21/25, with pathology showing residual DCIS within 0.1 mm of new surgical margin.  Completed adjuvant radiotherapy in April 2025.  Tamoxifen 5 mg daily started May 2025    Subjective     CHIEF COMPLAINT: DCIS    HISTORY OF PRESENT ILLNESS:   Antoinette Leigh returns for follow-up.   She started tamoxifen about a month ago.  She has had hot flashes and night sweats.  They have been worse this past week.  Her  reports that at times she has had to change her clothes multiple times in 1 night because of sweating through them.  Otherwise she says she is doing pretty well.      Objective      /81   Pulse 62   Temp 97.3 °F (36.3 °C) (Infrared)   Resp 16   Ht 154.9 cm (61\") Comment: Pt reported  Wt 76.2 kg (168 lb)   SpO2 97%   BMI 31.74 kg/m²      Performance Status:  ECOG score: 0             General: well appearing female in no acute distress  Neuro: alert and oriented  HEENT: sclera anicteric, oropharynx clear  Skin: no rashes, lesions, bruising, or petechiae  Psych: mood and affect appropriate    I have reexamined the patient and the results are consistent with the previously documented exam. Becca Kincadi MD        RECENT LABS:  Lab Results   Component Value Date    WBC 4.88 11/13/2024    HGB 13.7 11/13/2024    HCT 42.0 11/13/2024    MCV 90.3 11/13/2024     11/13/2024       Lab Results   Component Value Date    GLUCOSE 112 (H) 11/13/2024    BUN 10 11/13/2024    CREATININE 1.04 (H) 11/13/2024    BCR 9.6 11/13/2024    K 4.7 11/13/2024    CO2 26.9 11/13/2024    CALCIUM 9.9 11/13/2024    ALBUMIN 4.5 11/13/2024    AST 22 11/13/2024    ALT 14 11/13/2024         .        ASSESSMENT AND PLAN:     Antoinette Leigh is a 65 y.o. female with ER positive OR positive DCIS of the left breast.    She returns after recently " starting tamoxifen 5 mg.  She is tolerating this fairly well other than hot flashes and night sweats.  These have been more significant the past week.  We discussed the options of stopping treatment, continuing treatment and potentially changing the time of day she takes the pill, or starting a medication to treat the hot flashes.  We briefly discussed [place].    For now she is going to try changing the time of day and give this a little more time.  If the hot flashes continue to be significant she will call and we can think about trying a medication to treat this.    I will order a diagnostic mammogram to be done in October.    Follow-up in 1 year or sooner if needed.                        Becca Kincaid MD  [facility] Hematology [facility] Oncology    6/17/2025          CC: